# Patient Record
Sex: MALE | Race: WHITE | NOT HISPANIC OR LATINO | URBAN - METROPOLITAN AREA
[De-identification: names, ages, dates, MRNs, and addresses within clinical notes are randomized per-mention and may not be internally consistent; named-entity substitution may affect disease eponyms.]

---

## 2023-04-28 ENCOUNTER — LAB (OUTPATIENT)
Dept: LAB | Facility: HOSPITAL | Age: 63
End: 2023-04-28
Payer: COMMERCIAL

## 2023-04-28 ENCOUNTER — OFFICE VISIT (OUTPATIENT)
Dept: FAMILY MEDICINE CLINIC | Facility: CLINIC | Age: 63
End: 2023-04-28
Payer: COMMERCIAL

## 2023-04-28 VITALS
BODY MASS INDEX: 29.68 KG/M2 | HEIGHT: 71 IN | SYSTOLIC BLOOD PRESSURE: 124 MMHG | DIASTOLIC BLOOD PRESSURE: 84 MMHG | WEIGHT: 212 LBS | TEMPERATURE: 97.8 F | HEART RATE: 80 BPM | OXYGEN SATURATION: 98 %

## 2023-04-28 DIAGNOSIS — R35.1 NOCTURIA: ICD-10-CM

## 2023-04-28 DIAGNOSIS — B00.9 HSV INFECTION: Primary | ICD-10-CM

## 2023-04-28 DIAGNOSIS — E03.9 HYPOTHYROIDISM, UNSPECIFIED TYPE: ICD-10-CM

## 2023-04-28 PROBLEM — M25.561 ARTHRALGIA OF RIGHT KNEE: Status: ACTIVE | Noted: 2019-04-22

## 2023-04-28 PROBLEM — E78.49 OTHER HYPERLIPIDEMIA: Status: ACTIVE | Noted: 2021-11-23

## 2023-04-28 PROBLEM — M25.529 ARTHRALGIA OF ELBOW: Status: ACTIVE | Noted: 2019-11-11

## 2023-04-28 PROBLEM — M54.50 LOWER BACK PAIN: Status: ACTIVE | Noted: 2021-08-19

## 2023-04-28 PROBLEM — M25.551 PAIN OF RIGHT HIP JOINT: Status: ACTIVE | Noted: 2022-10-10

## 2023-04-28 PROBLEM — M25.511 PAIN IN JOINT OF RIGHT SHOULDER: Status: ACTIVE | Noted: 2020-06-19

## 2023-04-28 PROBLEM — I34.0 MITRAL VALVE REGURGITATION: Status: ACTIVE | Noted: 2021-11-23

## 2023-04-28 PROBLEM — R03.0 ELEVATED BP WITHOUT DIAGNOSIS OF HYPERTENSION: Status: ACTIVE | Noted: 2021-11-23

## 2023-04-28 LAB — PSA SERPL-MCNC: 0.43 NG/ML (ref 0–4)

## 2023-04-28 PROCEDURE — 36415 COLL VENOUS BLD VENIPUNCTURE: CPT

## 2023-04-28 PROCEDURE — 99203 OFFICE O/P NEW LOW 30 MIN: CPT | Performed by: FAMILY MEDICINE

## 2023-04-28 PROCEDURE — 84153 ASSAY OF PSA TOTAL: CPT

## 2023-04-28 RX ORDER — NAPROXEN 500 MG/1
500 TABLET ORAL
COMMUNITY

## 2023-04-28 RX ORDER — ASPIRIN 81 MG/1
TABLET ORAL
COMMUNITY

## 2023-04-28 RX ORDER — ACYCLOVIR 400 MG/1
800 TABLET ORAL
COMMUNITY
End: 2023-04-28 | Stop reason: SDUPTHER

## 2023-04-28 RX ORDER — ACYCLOVIR 400 MG/1
800 TABLET ORAL
Qty: 20 TABLET | Refills: 1 | Status: SHIPPED | OUTPATIENT
Start: 2023-04-28

## 2023-04-28 RX ORDER — LEVOTHYROXINE SODIUM 0.12 MG/1
1 TABLET ORAL DAILY
COMMUNITY
Start: 2023-02-24 | End: 2023-04-28

## 2023-04-28 RX ORDER — LEVOTHYROXINE SODIUM 125 UG/1
CAPSULE ORAL
COMMUNITY

## 2023-04-28 RX ORDER — ACETAMINOPHEN 650 MG/1
650 TABLET, FILM COATED, EXTENDED RELEASE ORAL 3 TIMES DAILY
COMMUNITY
Start: 2023-01-13

## 2023-04-28 NOTE — PROGRESS NOTES
New Patient Office Visit      Patient Name: José Miguel Underwood  : 1960   MRN: [id number]     Chief Complaint:    Chief Complaint   Patient presents with   • Arthritis     Cold sores. IT BAND SYNDROME.       History of Present Illness: José Miguel Underwood is a 63 y.o. male who is here today to establish care.  He is seeing [facility] for IT band syndrome This started in  and he takes naproxen and uses heating pad. He uses tylenol. His pain is bilateral. His right IT is torn. He may need surgery.     Never had colonoscopy. is thinking about getting. No family history of colon cancer, but father had polyps.     Prostate cancer screen. He is wanting cbc, psa, a1c, cmp, tsh.     Hypothyroidism is doing well. He reports normal levels. Has been on similar level for a while.    He does get tired in the evenings. He works a lot and thinks a lot of tiredness is from work load. He takes aleve PM to sleep. He reports being a light sleeper. He drinks a couple cups of coffee per week. Been going on for a couple months. Does get up to urinate at times. He drinks a lot water and coffee.     He went to  for heart testing. They did a calcium score and reports he has blockages. His EF is slightly below normal. He will get records for us      Review of systems positive for tiredness      Physical exam: Patient's heart exam RRR.  Lung exam CTA bilateral.  Mood and affect appropriate.  No lymphadenopathy in the cervical region.      Subjective          Past Medical History: History reviewed. No pertinent past medical history.    Past Surgical History: History reviewed. No pertinent surgical history.    Family History:   Family History   Problem Relation Age of Onset   • Diabetes Mother        Social History:   Social History     Socioeconomic History   • Marital status: Single   Tobacco Use   • Smoking status: Never   • Smokeless tobacco: Never   Vaping Use   • Vaping Use: Never used   Substance and Sexual Activity   •  "Alcohol use: Never   • Drug use: Never   • Sexual activity: Yes     Partners: Female       Medications:     Current Outpatient Medications:   •  acyclovir (ZOVIRAX) 400 MG tablet, Take 2 tablets by mouth Every 4 (Four) Hours While Awake. Take no more than 5 doses a day., Disp: 20 tablet, Rfl: 1  •  aspirin 81 MG EC tablet, Asprin Ec Low Dose, Disp: , Rfl:   •  levothyroxine sodium (TIROSINT) 125 MCG capsule capsule, levothyroxine, Disp: , Rfl:   •  Mapap Arthritis Pain 650 MG 8 hr tablet, Take 1 tablet by mouth 3 (Three) Times a Day., Disp: , Rfl:   •  naproxen (NAPROSYN) 500 MG tablet, Take 1 tablet by mouth., Disp: , Rfl:     Allergies:   Allergies   Allergen Reactions   • Shrimp Flavor Swelling   • Diclofenac Hives   • Loop Diuretics Nausea Only, Diarrhea and GI Intolerance   • Shellfish Allergy Hives   • Sulfa Antibiotics Hives       Objective     Physical Exam: Please see above  Vital Signs:   Vitals:    04/28/23 0837   BP: 124/84   Pulse: 80   Temp: 97.8 °F (36.6 °C)   SpO2: 98%   Weight: 96.2 kg (212 lb)   Height: 180.3 cm (71\")     Body mass index is 29.57 kg/m².       Assessment / Plan      Assessment/Plan:   Diagnoses and all orders for this visit:    1. HSV infection (Primary)  -     acyclovir (ZOVIRAX) 400 MG tablet; Take 2 tablets by mouth Every 4 (Four) Hours While Awake. Take no more than 5 doses a day.  Dispense: 20 tablet; Refill: 1    2. Nocturia  -     PSA DIAGNOSTIC ONLY; Future    3. Hypothyroidism, unspecified type         1. Patient reports hypothyroidism has been controlled since last year's labs.  Denies any new symptoms other than tiredness.  2. Nocturia-worsening issue.  We will check PSA today.  3. HSV treatment-patient requesting refills on acyclovir.  A refills given today.  Symptoms controlled currently.    Chronic conditions nocturia, hypothyroidism, HSV addressed.  Blood work ordered to address nocturia worsening.  We will consider further work-up in the future if it does not " improve.  Hypothyroidism is reportedly well controlled, will recheck levels at next visit.  HSV well-controlled, refilled medications today.    Multiple chronic conditions assessed above.  Nocturia worsening, hypothyroidism and HSV stable.  Refilled HSV medication.      Follow Up:   Return in about 4 months (around 8/28/2023) for Annual, Labs today.    Timoteo Hudson DO  [facility] [place]

## 2023-05-22 RX ORDER — LEVOTHYROXINE SODIUM 125 UG/1
125 CAPSULE ORAL DAILY
Qty: 30 CAPSULE | Refills: 0 | Status: SHIPPED | OUTPATIENT
Start: 2023-05-22

## 2023-05-22 NOTE — TELEPHONE ENCOUNTER
Rx Refill Note  Requested Prescriptions     Pending Prescriptions Disp Refills   • levothyroxine sodium (TIROSINT) 125 MCG capsule capsule 30 capsule       Last office visit with prescribing clinician: 4/28/2023   Last telemedicine visit with prescribing clinician: 4/28/2023   Next office visit with prescribing clinician: 9/28/2023                         Would you like a call back once the refill request has been completed: [] Yes [] No    If the office needs to give you a call back, can they leave a voicemail: [] Yes [] No    Chapis Villafuerte MA  05/22/23, 09:20 EDT

## 2023-05-22 NOTE — TELEPHONE ENCOUNTER
Caller: José Miguel Underwood    Relationship: Self    Best call back number: [phone]    Requested Prescriptions:   Requested Prescriptions     Pending Prescriptions Disp Refills   • levothyroxine sodium (TIROSINT) 125 MCG capsule capsule 30 capsule         Pharm[facility] where request should be sent[id number] [place], [place] - [address] [facility] [address]  - [phone]  - [phone] FX     Last office visit with prescribing clinician: 4/28/2023   Last telemedicine visit with prescribing clinician: 4/28/2023   Next office visit with prescribing clinician: 9/28/2023     Additional details provided by patient:     Does the patient have less than a 3 day supply:  [] Yes  [x] No    Would you like a call back once the refill request has been completed: [] Yes [x] No    If the office needs to give you a call back, can they leave a voicemail: [] Yes [x] No    Jann Melo Rep   05/22/23 08:59 EDT

## 2023-08-22 ENCOUNTER — OFFICE VISIT (OUTPATIENT)
Dept: FAMILY MEDICINE CLINIC | Facility: CLINIC | Age: 63
End: 2023-08-22
Payer: COMMERCIAL

## 2023-08-22 VITALS
WEIGHT: 208.8 LBS | SYSTOLIC BLOOD PRESSURE: 154 MMHG | HEIGHT: 71 IN | HEART RATE: 80 BPM | BODY MASS INDEX: 29.23 KG/M2 | DIASTOLIC BLOOD PRESSURE: 96 MMHG | TEMPERATURE: 98.7 F

## 2023-08-22 DIAGNOSIS — R21 RASH: ICD-10-CM

## 2023-08-22 DIAGNOSIS — R50.9 FEVER, UNSPECIFIED FEVER CAUSE: ICD-10-CM

## 2023-08-22 DIAGNOSIS — U07.1 COVID-19 VIRUS DETECTED: Primary | ICD-10-CM

## 2023-08-22 LAB
EXPIRATION DATE: ABNORMAL
INTERNAL CONTROL: ABNORMAL
Lab: ABNORMAL
SARS-COV-2 AG UPPER RESP QL IA.RAPID: DETECTED

## 2023-08-22 PROCEDURE — 99213 OFFICE O/P EST LOW 20 MIN: CPT | Performed by: FAMILY MEDICINE

## 2023-08-22 PROCEDURE — 87426 SARSCOV CORONAVIRUS AG IA: CPT | Performed by: FAMILY MEDICINE

## 2023-08-22 RX ORDER — TRIAMCINOLONE ACETONIDE 5 MG/G
1 CREAM TOPICAL 2 TIMES DAILY
Qty: 30 G | Refills: 0 | Status: SHIPPED | OUTPATIENT
Start: 2023-08-22

## 2023-08-22 NOTE — ASSESSMENT & PLAN NOTE
Managing Coronavirus (Covid-19) Symptoms at home  Signs and Symptoms of Covid-19 may range from a mild case of sniffles to severe disease requiring hospitalization. The majority of patients will only experience a mild illness and recover well at home with some care. Treatment is aimed at symptom control - rest, fluid intake, fever reduction, cough suppression and pain relief.   Stay home and isolate yourself from family members. Use delivery services to have groceries and medications delivered or ask friends to drop-off groceries at your doorstep. You may want to have friends or family take your pets for a few days.   Monitor yourself: Take your temperature twice a day. If you own a pulse oximeter, make sure you know how to use it properly. Your pulse oximeter should read more than 92%.  Get plenty of rest, stay hydrated and use over the counter medications as necessary. Ask your doctor about the doses for these medications.     Hydration   Good hydration can alleviate many of the symptoms. Drink plenty of water or sports drinks, if you have a dry cough, a teaspoon of honey in hot water can help soothe your throat. If you have congestion, a warm, non-caffeinated beverage or warm shower can help loosen mucus.    WHO Oral Rehydration Solution:  3/8 tsp salt (sodium chloride)  1/4 tsp Kim Salt Substitute (potassium chloride)  1/2 tsp baking soda (sodium bicarbonate)  2 tablespoon + 2 teaspoon sugar  Add tap water to make 1 liter   Optional: May add NutraSweet or Splenda for flavor, Crystal light or sugar free Jello also works.   Do not use red or purple colored Jello or Crystal light.     Over the counter medications:   Acetaminophen (Tylenol): reduces fever and pain - body aches, sore throat etc.  Guaifenesin (Mucinex) - expectorant, reduces chest congestion, helps bring up mucous.   Dextromethorphan (Mucinex-DM or Delsym) - cough suppressant  Afrin - nasal decongestant. Helps open up a stuffy nose. Be careful  not to use this medication more than twice a day for no more than three days in a row, or your symptoms may get worse.     Prescription medications:   Sometimes you will need prescription medications to manage your symptoms. Here are some common ones that are used.   Ondansetron (Zofran) - used to prevent nausea or vomiting. It comes in a pill and an under the tongue formulation.   Promethazine (Phenergan) and Prochlorperazine (Compazine) - also used to treat nausea and vomiting. Both of these are available as tablets and suppositories  Benzonatate (Tessalon) - cough suppressant. It is important that you take this capsule whole and not crush or chew it.     How to avoid infecting other members of your household:  Avoid contact with members of your household, including pets.  Stay home from work, school and public areas unless it's to get medical care.  Avoid using public transportation, ride-sharing services or taxis.  Stay isolated in one room, away from your family and other people, as much as possible. This includes eating in your room. Open windows to keep air circulating. Use a separate bathroom, if possible.  Avoid shared spaces in your home as much as possible. When using shared spaces, limit your movements and wear a mask. Keep your kitchen and other shared spaces well ventilated. Stay at least 6 feet (2 meters) away from your family members.  Clean often-touched surfaces in your separate room and bathroom, such as doorknobs, light switches, electronics and counters, every day.  Avoid sharing personal household items such as dishes, towels, bedding and electronics.  Wear a face mask when near others. Change the face mask each day.  If wearing a face mask isn't possible, cover your mouth and nose with a tissue or elbow when coughing or sneezing. Afterward, throw away the tissue or wash the handkerchief.  Frequently wash your hands with soap and water for at least 20 seconds, or use an alcohol-based hand   that contains at least 60% alcohol.    Signs that should warrant an evaluation at a higher level of care such as a local emergency room:  Trouble breathing when you are resting or walking short distances (such as from your bedroom to bathroom).  Your pulse oximeter reading is below 92% and does not improve with slow deep breathing and lying on your stomach.  A fever of 102 F(38.9 C) or higher that lasts for 24 hours and does not get better after you take acetaminophen (Tylenol).  Persistent chest pain or pressure.  Blood in your sputum.  A very bad headache that will not improve or go away with Tylenol.  New confusion.  Bluish lips or face.  Inability to stay awake.  Pale gray or blue-colored skin, lips or nail beds (depends on skin tone).    Isolation period: Isolation period of at least 5 days if symptoms are improving.     Note:   If you are immunosuppressed due to cancer, chemotherapy or use of certain medications, please discuss specific guidelines with your physician/physician assistant/nurse practitioner.

## 2023-08-22 NOTE — PROGRESS NOTES
"Chief Complaint  Insect Bite (Pt states that he has deer ticks ) and Fever (Nausea, headache between eyes, joint aches, drainage/)    Subjective        José Miguel Underwood presents to [facility]  Fever   This is a new problem. The current episode started in the past 7 days. The problem has been waxing and waning. Associated symptoms include congestion, coughing, headaches, muscle aches, nausea and sleepiness. He has tried NSAIDs, acetaminophen and fluids for the symptoms. The treatment provided mild relief.         Objective   Vital Signs:  /96   Pulse 80   Temp 98.7 øF (37.1 øC) (Infrared)   Ht 180.3 cm (70.98\")   Wt 94.7 kg (208 lb 12.8 oz)   BMI 29.13 kg/mý   Estimated body mass index is 29.13 kg/mý as calculated from the following:    Height as of this encounter: 180.3 cm (70.98\").    Weight as of this encounter: 94.7 kg (208 lb 12.8 oz).           Physical Exam  Constitutional:       Appearance: He is not ill-appearing.   HENT:      Right Ear: Tympanic membrane normal.      Left Ear: Tympanic membrane normal.      Nose: Rhinorrhea present.   Eyes:      Pupils: Pupils are equal, round, and reactive to light.   Cardiovascular:      Rate and Rhythm: Normal rate.      Pulses: Normal pulses.   Pulmonary:      Effort: Pulmonary effort is normal.      Breath sounds: Normal breath sounds.   Skin:     Capillary Refill: Capillary refill takes less than 2 seconds.   Neurological:      General: No focal deficit present.      Mental Status: He is alert. Mental status is at baseline.   Psychiatric:         Mood and Affect: Mood normal.         Behavior: Behavior normal.         Thought Content: Thought content normal.         Judgment: Judgment normal.      Result Review :           Assessment and Plan   Diagnoses and all orders for this visit:    1. COVID-19 virus detected (Primary)  Assessment & Plan:    Managing Coronavirus (Covid-19) Symptoms at home  Signs and Symptoms of " Covid-19 may range from a mild case of sniffles to severe disease requiring hospitalization. The majority of patients will only experience a mild illness and recover well at home with some care. Treatment is aimed at symptom control - rest, fluid intake, fever reduction, cough suppression and pain relief.   Stay home and isolate yourself from family members. Use delivery services to have groceries and medications delivered or ask friends to drop-off groceries at your doorstep. You may want to have friends or family take your pets for a few days.   Monitor yourself: Take your temperature twice a day. If you own a pulse oximeter, make sure you know how to use it properly. Your pulse oximeter should read more than 92%.  Get plenty of rest, stay hydrated and use over the counter medications as necessary. Ask your doctor about the doses for these medications.     Hydration   Good hydration can alleviate many of the symptoms. Drink plenty of water or sports drinks, if you have a dry cough, a teaspoon of honey in hot water can help soothe your throat. If you have congestion, a warm, non-caffeinated beverage or warm shower can help loosen mucus.    WHO Oral Rehydration Solution:  3/8 tsp salt (sodium chloride)  1/4 tsp Kim Salt Substitute (potassium chloride)  1/2 tsp baking soda (sodium bicarbonate)  2 tablespoon + 2 teaspoon sugar  Add tap water to make 1 liter   Optional: May add NutraSweet or Splenda for flavor, Crystal light or sugar free Jello also works.   Do not use red or purple colored Jello or Crystal light.     Over the counter medications:   Acetaminophen (Tylenol): reduces fever and pain - body aches, sore throat etc.  Guaifenesin (Mucinex) - expectorant, reduces chest congestion, helps bring up mucous.   Dextromethorphan (Mucinex-DM or Delsym) - cough suppressant  Afrin - nasal decongestant. Helps open up a stuffy nose. Be careful not to use this medication more than twice a day for no more than three days  in a row, or your symptoms may get worse.     Prescription medications:   Sometimes you will need prescription medications to manage your symptoms. Here are some common ones that are used.   Ondansetron (Zofran) - used to prevent nausea or vomiting. It comes in a pill and an under the tongue formulation.   Promethazine (Phenergan) and Prochlorperazine (Compazine) - also used to treat nausea and vomiting. Both of these are available as tablets and suppositories  Benzonatate (Tessalon) - cough suppressant. It is important that you take this capsule whole and not crush or chew it.     How to avoid infecting other members of your household:  Avoid contact with members of your household, including pets.  Stay home from work, school and public areas unless it's to get medical care.  Avoid using public transportation, ride-sharing services or taxis.  Stay isolated in one room, away from your family and other people, as much as possible. This includes eating in your room. Open windows to keep air circulating. Use a separate bathroom, if possible.  Avoid shared spaces in your home as much as possible. When using shared spaces, limit your movements and wear a mask. Keep your kitchen and other shared spaces well ventilated. Stay at least 6 feet (2 meters) away from your family members.  Clean often-touched surfaces in your separate room and bathroom, such as doorknobs, light switches, electronics and counters, every day.  Avoid sharing personal household items such as dishes, towels, bedding and electronics.  Wear a face mask when near others. Change the face mask each day.  If wearing a face mask isn't possible, cover your mouth and nose with a tissue or elbow when coughing or sneezing. Afterward, throw away the tissue or wash the handkerchief.  Frequently wash your hands with soap and water for at least 20 seconds, or use an alcohol-based hand  that contains at least 60% alcohol.    Signs that should warrant an  evaluation at a higher level of care such as a local emergency room:  Trouble breathing when you are resting or walking short distances (such as from your bedroom to bathroom).  Your pulse oximeter reading is below 92% and does not improve with slow deep breathing and lying on your stomach.  A fever of 102 F(38.9 C) or higher that lasts for 24 hours and does not get better after you take acetaminophen (Tylenol).  Persistent chest pain or pressure.  Blood in your sputum.  A very bad headache that will not improve or go away with Tylenol.  New confusion.  Bluish lips or face.  Inability to stay awake.  Pale gray or blue-colored skin, lips or nail beds (depends on skin tone).    Isolation period: Isolation period of at least 5 days if symptoms are improving.     Note:   If you are immunosuppressed due to cancer, chemotherapy or use of certain medications, please discuss specific guidelines with your physician/physician assistant/nurse practitioner.              2. Fever, unspecified fever cause  -     POCT VERITOR SARS-CoV-2 Antigen  -     Cancel: POC Influenza A / B    3. Rash  Assessment & Plan:  Most likely contact secondary to chemicals used to deter ticks.  Prescription for triamcinolone      Other orders  -     triamcinolone (KENALOG) 0.5 % cream; Apply 1 application  topically to the appropriate area as directed 2 (Two) Times a Day.  Dispense: 30 g; Refill: 0             Follow Up   No follow-ups on file.  Patient was given instructions and counseling regarding his condition or for health maintenance advice. Please see specific information pulled into the AVS if appropriate.       This document has been electronically signed by Ade Worrell DO   August 22, 2023 11:18 EDT    Dictated Utilizing Dragon Dictation: Part of this note may be an electronic transcription/translation of spoken language to printed text using the Dragon Dictation System.    Ade Worrell D.O.  [facility] [place]

## 2023-08-22 NOTE — LETTER
August 22, 2023     Patient: José Miguel Underwood   YOB: 1960   Date of Visit: 8/22/2023       To Whom It May Concern:    It is my medical opinion that José Miguel Underwood may return to work on 8/26/23         Sincerely,        Ade Worrell DO    CC: No Recipients

## 2023-09-28 ENCOUNTER — OFFICE VISIT (OUTPATIENT)
Dept: FAMILY MEDICINE CLINIC | Facility: CLINIC | Age: 63
End: 2023-09-28
Payer: COMMERCIAL

## 2023-09-28 VITALS
HEART RATE: 108 BPM | DIASTOLIC BLOOD PRESSURE: 94 MMHG | WEIGHT: 208 LBS | HEIGHT: 71 IN | SYSTOLIC BLOOD PRESSURE: 136 MMHG | BODY MASS INDEX: 29.12 KG/M2

## 2023-09-28 DIAGNOSIS — E03.9 HYPOTHYROIDISM, UNSPECIFIED TYPE: ICD-10-CM

## 2023-09-28 DIAGNOSIS — E78.41 ELEVATED LIPOPROTEIN(A): ICD-10-CM

## 2023-09-28 DIAGNOSIS — E66.3 OVERWEIGHT (BMI 25.0-29.9): ICD-10-CM

## 2023-09-28 DIAGNOSIS — M25.551 CHRONIC PAIN OF RIGHT HIP: ICD-10-CM

## 2023-09-28 DIAGNOSIS — G89.29 CHRONIC PAIN OF RIGHT HIP: ICD-10-CM

## 2023-09-28 DIAGNOSIS — Z12.5 PROSTATE CANCER SCREENING: ICD-10-CM

## 2023-09-28 DIAGNOSIS — Z11.59 NEED FOR HEPATITIS C SCREENING TEST: ICD-10-CM

## 2023-09-28 DIAGNOSIS — Z12.11 ENCOUNTER FOR COLORECTAL CANCER SCREENING: ICD-10-CM

## 2023-09-28 DIAGNOSIS — Z11.4 ENCOUNTER FOR SCREENING FOR HIV: ICD-10-CM

## 2023-09-28 DIAGNOSIS — Z12.12 ENCOUNTER FOR COLORECTAL CANCER SCREENING: ICD-10-CM

## 2023-09-28 DIAGNOSIS — Z00.00 ANNUAL PHYSICAL EXAM: Primary | ICD-10-CM

## 2023-09-28 RX ORDER — DULOXETIN HYDROCHLORIDE 30 MG/1
30 CAPSULE, DELAYED RELEASE ORAL DAILY
Qty: 30 CAPSULE | Refills: 2 | Status: SHIPPED | OUTPATIENT
Start: 2023-09-28

## 2023-09-28 RX ORDER — NAPROXEN 500 MG/1
500 TABLET ORAL 2 TIMES DAILY WITH MEALS
Qty: 180 TABLET | Refills: 3 | Status: SHIPPED | OUTPATIENT
Start: 2023-09-28

## 2023-09-28 RX ORDER — ACYCLOVIR 400 MG/1
TABLET ORAL
COMMUNITY
Start: 2023-09-10

## 2023-09-28 RX ORDER — LEVOTHYROXINE SODIUM 125 UG/1
125 CAPSULE ORAL DAILY
Qty: 90 CAPSULE | Refills: 3 | Status: SHIPPED | OUTPATIENT
Start: 2023-09-28 | End: 2023-10-02

## 2023-09-28 NOTE — PROGRESS NOTES
Follow Up Office Visit      Patient Name: José Miguel Underwood  : 1960   MRN: [id number]     Chief Complaint:    Chief Complaint   Patient presents with    Annual Exam       History of Present Illness: José Miguel Underwood is a 63 y.o. male who is here today to follow up with chronic hip pain, hypothyroidism, HSV.  Patient is here for his annual exam.  He is overweight.  He reports that he has tried losing weight in the past with diet and exercise.  He has hit a plateau.    Has chronic hip pain and IT band pain.  Goes to a specialist.  Has never heard or tried shockwave therapy.  Interested in medication otherwise it may help.  He takes naproxen daily    Hypothyroidism-controlled, needs repeat labs    HSV-controlled, as needed meds used    Annual exam: Discussed diet, exercise, vaccines, prostate cancer screening, colorectal cancer screening, dental and vision care    Physical exam: Patient's mood and affect is appropriate neurological exam grossly intact.  PERRLA.  Neck supple.  Lung exam CTA bilateral.  Heart exam RRR.  Bilateral ear exam normal.    Subjective        I have reviewed and the following portions of the patient's history were updated as appropriate: past family history, past medical history, past social history, past surgical history and problem list.    Medications:     Current Outpatient Medications:     acyclovir (ZOVIRAX) 400 MG tablet, TAKE 2 TABLETS BY MOUTH EVERY 4 HOURS WHILE AWAKE. NO MORE THAN 5 DOSES A DAY, Disp: , Rfl:     aspirin 81 MG EC tablet, Asprin Ec Low Dose, Disp: , Rfl:     levothyroxine sodium (TIROSINT) 125 MCG capsule capsule, Take 1 capsule by mouth Daily., Disp: 90 capsule, Rfl: 3    Mapap Arthritis Pain 650 MG 8 hr tablet, Take 1 tablet by mouth 3 (Three) Times a Day., Disp: , Rfl:     naproxen (NAPROSYN) 500 MG tablet, Take 1 tablet by mouth 2 (Two) Times a Day With Meals., Disp: 180 tablet, Rfl: 3    DULoxetine (Cymbalta) 30 MG capsule, Take 1 capsule by mouth Daily.,  "Disp: 30 capsule, Rfl: 2    Allergies:   Allergies   Allergen Reactions    Shrimp Flavor Swelling    Diclofenac Hives    Loop Diuretics Nausea Only, Diarrhea and GI Intolerance    Shellfish Allergy Hives    Sulfa Antibiotics Hives       Objective     Physical Exam: Please see above  Vital Signs:   Vitals:    09/28/23 1452   BP: 136/94   Pulse: 108   Weight: 94.3 kg (208 lb)   Height: 180.3 cm (71\")     Body mass index is 29.01 kg/m².          Assessment / Plan      Assessment/Plan:   Diagnoses and all orders for this visit:    1. Annual physical exam (Primary)  -     CBC & Differential; Future  -     Comprehensive Metabolic Panel; Future    2. Overweight (BMI 25.0-29.9)  -     Ambulatory Referral to Weight Management Program    3. Hypothyroidism, unspecified type  -     levothyroxine sodium (TIROSINT) 125 MCG capsule capsule; Take 1 capsule by mouth Daily.  Dispense: 90 capsule; Refill: 3  -     TSH Rfx On Abnormal To Free T4; Future    4. Chronic pain of right hip  -     naproxen (NAPROSYN) 500 MG tablet; Take 1 tablet by mouth 2 (Two) Times a Day With Meals.  Dispense: 180 tablet; Refill: 3  -     DULoxetine (Cymbalta) 30 MG capsule; Take 1 capsule by mouth Daily.  Dispense: 30 capsule; Refill: 2    5. Elevated lipoprotein(a)  -     Lipid Panel; Future    6. Prostate cancer screening  -     PSA Screen; Future    7. Need for hepatitis C screening test  -     Hepatitis C Antibody; Future    8. Encounter for screening for HIV  -     HIV-1 / O / 2 Ag / Antibody; Future    9. Encounter for colorectal cancer screening  -     Ambulatory Referral For Screening Colonoscopy       Annual exam counseling: Counseled patient regards to diet, exercise, culture [place] screening, HIV screening, hepatitis C screening, PSA for prostate cancer screening.  Discussed dental and vision care.  Refer patient to weight management    Refer patient to weight management.  Patient interesting and exercise and dieting.  May be interested in " medications.    Hypothyroidism-controlled, recheck today    Hip pain-recommend shockwave therapy.  Patient given information for chiropractor.  Continue naproxen.  Start Cymbalta.  Can increase dose in 1-2 months to help with pain further if needed, follow-up in 3 months to reevaluate-we will wean patient off if is not helping        BMI is >= 25 and <30. (Overweight) The following options were offered after discussion;: weight loss educational material (shared in after visit summary), exercise counseling/recommendations, and nutrition counseling/recommendations     Follow Up:   Return in about 3 months (around 12/28/2023) for Recheck.    Timoteo Hudson DO  [facility] [place]

## 2023-10-02 DIAGNOSIS — E03.9 HYPOTHYROIDISM, UNSPECIFIED TYPE: Primary | ICD-10-CM

## 2023-10-02 RX ORDER — LEVOTHYROXINE SODIUM 0.12 MG/1
125 TABLET ORAL DAILY
Qty: 90 TABLET | Refills: 1 | Status: SHIPPED | OUTPATIENT
Start: 2023-10-02

## 2023-10-10 ENCOUNTER — LAB (OUTPATIENT)
Dept: LAB | Facility: HOSPITAL | Age: 63
End: 2023-10-10
Payer: COMMERCIAL

## 2023-10-10 DIAGNOSIS — Z11.59 NEED FOR HEPATITIS C SCREENING TEST: ICD-10-CM

## 2023-10-10 DIAGNOSIS — Z11.4 ENCOUNTER FOR SCREENING FOR HIV: ICD-10-CM

## 2023-10-10 DIAGNOSIS — Z00.00 ANNUAL PHYSICAL EXAM: ICD-10-CM

## 2023-10-10 DIAGNOSIS — E03.9 HYPOTHYROIDISM, UNSPECIFIED TYPE: ICD-10-CM

## 2023-10-10 DIAGNOSIS — Z12.5 PROSTATE CANCER SCREENING: ICD-10-CM

## 2023-10-10 DIAGNOSIS — E78.41 ELEVATED LIPOPROTEIN(A): ICD-10-CM

## 2023-10-10 PROCEDURE — 86803 HEPATITIS C AB TEST: CPT

## 2023-10-10 PROCEDURE — G0103 PSA SCREENING: HCPCS

## 2023-10-10 PROCEDURE — 80050 GENERAL HEALTH PANEL: CPT

## 2023-10-10 PROCEDURE — G0432 EIA HIV-1/HIV-2 SCREEN: HCPCS

## 2023-10-10 PROCEDURE — 80061 LIPID PANEL: CPT

## 2023-10-10 PROCEDURE — 84439 ASSAY OF FREE THYROXINE: CPT

## 2023-10-11 LAB
ALBUMIN SERPL-MCNC: 4.7 G/DL (ref 3.5–5.2)
ALBUMIN/GLOB SERPL: 1.8 G/DL
ALP SERPL-CCNC: 94 U/L (ref 39–117)
ALT SERPL W P-5'-P-CCNC: 24 U/L (ref 1–41)
ANION GAP SERPL CALCULATED.3IONS-SCNC: 11 MMOL/L (ref 5–15)
AST SERPL-CCNC: 23 U/L (ref 1–40)
BASOPHILS # BLD AUTO: 0.1 10*3/MM3 (ref 0–0.2)
BASOPHILS NFR BLD AUTO: 1.1 % (ref 0–1.5)
BILIRUB SERPL-MCNC: 0.4 MG/DL (ref 0–1.2)
BUN SERPL-MCNC: 15 MG/DL (ref 8–23)
BUN/CREAT SERPL: 12.5 (ref 7–25)
CALCIUM SPEC-SCNC: 9.9 MG/DL (ref 8.6–10.5)
CHLORIDE SERPL-SCNC: 107 MMOL/L (ref 98–107)
CHOLEST SERPL-MCNC: 209 MG/DL (ref 0–200)
CO2 SERPL-SCNC: 24 MMOL/L (ref 22–29)
CREAT SERPL-MCNC: 1.2 MG/DL (ref 0.76–1.27)
DEPRECATED RDW RBC AUTO: 43.1 FL (ref 37–54)
EGFRCR SERPLBLD CKD-EPI 2021: 68 ML/MIN/1.73
EOSINOPHIL # BLD AUTO: 0.63 10*3/MM3 (ref 0–0.4)
EOSINOPHIL NFR BLD AUTO: 6.9 % (ref 0.3–6.2)
ERYTHROCYTE [DISTWIDTH] IN BLOOD BY AUTOMATED COUNT: 13.7 % (ref 12.3–15.4)
GLOBULIN UR ELPH-MCNC: 2.6 GM/DL
GLUCOSE SERPL-MCNC: 96 MG/DL (ref 65–99)
HCT VFR BLD AUTO: 42.7 % (ref 37.5–51)
HCV AB SER DONR QL: NORMAL
HDLC SERPL-MCNC: 39 MG/DL (ref 40–60)
HGB BLD-MCNC: 14.5 G/DL (ref 13–17.7)
HIV 1+2 AB+HIV1 P24 AG SERPL QL IA: NORMAL
IMM GRANULOCYTES # BLD AUTO: 0.04 10*3/MM3 (ref 0–0.05)
IMM GRANULOCYTES NFR BLD AUTO: 0.4 % (ref 0–0.5)
LDLC SERPL CALC-MCNC: 139 MG/DL (ref 0–100)
LDLC/HDLC SERPL: 3.47 {RATIO}
LYMPHOCYTES # BLD AUTO: 2.48 10*3/MM3 (ref 0.7–3.1)
LYMPHOCYTES NFR BLD AUTO: 27.1 % (ref 19.6–45.3)
MCH RBC QN AUTO: 29.4 PG (ref 26.6–33)
MCHC RBC AUTO-ENTMCNC: 34 G/DL (ref 31.5–35.7)
MCV RBC AUTO: 86.4 FL (ref 79–97)
MONOCYTES # BLD AUTO: 0.62 10*3/MM3 (ref 0.1–0.9)
MONOCYTES NFR BLD AUTO: 6.8 % (ref 5–12)
NEUTROPHILS NFR BLD AUTO: 5.27 10*3/MM3 (ref 1.7–7)
NEUTROPHILS NFR BLD AUTO: 57.7 % (ref 42.7–76)
NRBC BLD AUTO-RTO: 0 /100 WBC (ref 0–0.2)
PLATELET # BLD AUTO: 269 10*3/MM3 (ref 140–450)
PMV BLD AUTO: 11.1 FL (ref 6–12)
POTASSIUM SERPL-SCNC: 5.2 MMOL/L (ref 3.5–5.2)
PROT SERPL-MCNC: 7.3 G/DL (ref 6–8.5)
PSA SERPL-MCNC: 1.15 NG/ML (ref 0–4)
RBC # BLD AUTO: 4.94 10*6/MM3 (ref 4.14–5.8)
SODIUM SERPL-SCNC: 142 MMOL/L (ref 136–145)
T4 FREE SERPL-MCNC: 1.57 NG/DL (ref 0.93–1.7)
TRIGL SERPL-MCNC: 173 MG/DL (ref 0–150)
TSH SERPL DL<=0.05 MIU/L-ACNC: 4.35 UIU/ML (ref 0.27–4.2)
VLDLC SERPL-MCNC: 31 MG/DL (ref 5–40)
WBC NRBC COR # BLD: 9.14 10*3/MM3 (ref 3.4–10.8)

## 2023-11-20 ENCOUNTER — OFFICE VISIT (OUTPATIENT)
Dept: FAMILY MEDICINE CLINIC | Facility: CLINIC | Age: 63
End: 2023-11-20
Payer: COMMERCIAL

## 2023-11-20 VITALS
HEART RATE: 90 BPM | OXYGEN SATURATION: 100 % | DIASTOLIC BLOOD PRESSURE: 86 MMHG | WEIGHT: 208 LBS | BODY MASS INDEX: 29.12 KG/M2 | TEMPERATURE: 98.4 F | SYSTOLIC BLOOD PRESSURE: 136 MMHG | HEIGHT: 71 IN

## 2023-11-20 DIAGNOSIS — F07.81 POST CONCUSSION SYNDROME: ICD-10-CM

## 2023-11-20 DIAGNOSIS — Z23 IMMUNIZATION DUE: Primary | ICD-10-CM

## 2023-11-20 NOTE — PROGRESS NOTES
Follow Up Office Visit      Patient Name: José Miguel Underwood  : 1960   MRN: [id number]     Chief Complaint:    Chief Complaint   Patient presents with    Concussion     Had a pry bar hit him on top of head 2023. Short term memory not what it was. Having HA's and used to rarely get them.  When he turns his head from one side or the other, it takes time to focus. Sleeps more, no appetite.       History of Present Illness: José Miguel Underwood is a 63 y.o. male who is here today to follow up with postconcussion symptoms since he hit his head around 3 weeks ago.  Patient is here today with headache that occurs midline.  He is having issues with headache, focusing issues and memory issues.  These occur as the day goes on and is he tries to concentrate more.  He actually reports that he thought it was getting better until he was driving at nighttime recently and had issues with focusing.  Patient's been more tired recently.  This all started  when patient hit his self with a Crow bar.  Patient had a lot of blood coming from his scalp and he will to the ER the next day with a CT scan that was normal.  Patient says overall things have improved but over the past week or 2 he actually had worsening symptoms like headache with trying to focusing.  He also has headache with trying to watch TV or read.    No vomiting.  Patient able to wake up and be active throughout the day but symptoms are worsening.  Motor function seems to be appropriate      Physical exam: Neurologic exam grossly intact.  PERRLA.  Neck supple.  Somatic function noted in cervical region.      Subjective        I have reviewed and the following portions of the patient's history were updated as appropriate: past family history, past medical history, past social history, past surgical history and problem list.    Medications:     Current Outpatient Medications:     acyclovir (ZOVIRAX) 400 MG tablet, TAKE 2 TABLETS BY MOUTH EVERY 4  "HOURS WHILE AWAKE. NO MORE THAN 5 DOSES A DAY, Disp: , Rfl:     aspirin 81 MG EC tablet, Asprin Ec Low Dose, Disp: , Rfl:     levothyroxine (Synthroid) 125 MCG tablet, Take 1 tablet by mouth Daily., Disp: 90 tablet, Rfl: 1    Mapap Arthritis Pain 650 MG 8 hr tablet, Take 1 tablet by mouth 3 (Three) Times a Day., Disp: , Rfl:     naproxen (NAPROSYN) 500 MG tablet, Take 1 tablet by mouth 2 (Two) Times a Day With Meals., Disp: 180 tablet, Rfl: 3    Allergies:   Allergies   Allergen Reactions    Shrimp Flavor Swelling    Diclofenac Hives    Loop Diuretics Nausea Only, Diarrhea and GI Intolerance    Shellfish Allergy Hives    Sulfa Antibiotics Hives       Objective     Physical Exam: Please see above  Vital Signs:   Vitals:    11/20/23 1441   BP: 136/86   Pulse: 90   Temp: 98.4 °F (36.9 °C)   SpO2: 100%   Weight: 94.3 kg (208 lb)   Height: 180.3 cm (71\")     Body mass index is 29.01 kg/m².          Assessment / Plan      Assessment/Plan:   Diagnoses and all orders for this visit:    1. Immunization due (Primary)  -     Fluzone (or Fluarix & Flulaval for VFC) >6 Mos (5780-7519)    2. Post concussion syndrome    Patient presents with signs and symptoms of postconcussion syndrome.  Will monitor symptoms going forward and if worsening, will consider repeat CT and send patient to rehab.  May consider concussion clinic at .    Patient advised to watch his symptoms and to do less activity if his symptoms are worse.  Patient could likely have exacerbations of his symptoms daily and the symptoms may last up to 12 hours.  It would be best to stop working and to rest if these symptoms do occur.  I have given patient intermittent leave for the next month regarding his symptoms.  I will fill out [facility] paperwork for 4 weeks if this is received by our office.    If patient's symptoms worsen over the next 2 weeks, follow-up ASAP.  Patient given ER precautions    If symptoms are improved substantially in 4 weeks, he will be released to " work without restrictions.      Follow Up:   Return in about 4 weeks (around 12/18/2023), or if symptoms worsen or fail to improve, for Recheck.    Timoteo Hudson DO  [facility]   Answers submitted by the patient for this visit:  Primary Reason for Visit (Submitted on 11/19/2023)  What is the primary reason for your visit?: Neurological Problem  Neurological Problem Questionnaire (Submitted on 11/19/2023)  Chief Complaint: Neurologic complaint  altered mental status: Yes  clumsiness: Yes  focal sensory loss: No  focal weakness: No  loss of balance: Yes  memory loss: Yes  near-syncope: No  slurred speech: No  syncope: No  visual change: Yes  weakness: No  Chronicity: new  Onset: 1 to 4 weeks ago  Onset quality: gradually  Progression since onset: waxing and waning  Focality: facial  abdominal pain: No  auditory change: No  aura: Yes  back pain: No  bladder incontinence: No  bowel incontinence: No  chest pain: No  confusion: Yes  diaphoresis: No  dizziness: No  fatigue: Yes  fever: No  headaches: Yes  light-headedness: Yes  nausea: Yes  neck pain: Yes  palpitations: No  shortness of breath: No  vertigo: No  vomiting: No  Treatments tried: acetaminophen, bed rest, neck support, sleep  Improvement on treatment: moderate

## 2023-11-20 NOTE — LETTER
November 20, 2023     Patient: José Miguel Underwood   YOB: 1960   Date of Visit: 11/20/2023       To Whom It May Concern:    It is my medical opinion that José Miguel Underwood may return to work immediately with intermittent leave restrictions. He has post-concussion symptoms that can get exacerbated daily, and his symptoms can last up to 12 hours per episode. He should be excused from work on an intermittent basis for these flare ups. These restrictions are in place for one month, and he will be re-evaluated in 4 weeks for release to work without restrictions.     Please send any FMLA paperwork to our office, and if you have any questions please do not hesitate to reach out to our office.          Sincerely,        Timoteo Hudson DO    CC: No Recipients

## 2023-12-08 ENCOUNTER — TELEPHONE (OUTPATIENT)
Dept: FAMILY MEDICINE CLINIC | Facility: CLINIC | Age: 63
End: 2023-12-08

## 2023-12-08 NOTE — TELEPHONE ENCOUNTER
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 STILL HAVING BRAIN FOG. WOULD LIKE NEURO REFERRAL

## 2023-12-11 NOTE — TELEPHONE ENCOUNTER
"I called patient and made him aware of the following: \"Yes please have the patient follow-up as scheduled on the 18th.  He would like to be seen sooner that is okay as well.  Will discuss either refer him to neurology or to a concussion rehab \" FYI:  he said that the screen time isnt bothering him but driving at night is which he has now eliminated until he further sees you, he is also getting his vision back, still having some headaches though.  "

## 2024-01-04 ENCOUNTER — OFFICE VISIT (OUTPATIENT)
Dept: FAMILY MEDICINE CLINIC | Facility: CLINIC | Age: 64
End: 2024-01-04
Payer: COMMERCIAL

## 2024-01-04 VITALS
OXYGEN SATURATION: 98 % | HEIGHT: 71 IN | HEART RATE: 82 BPM | DIASTOLIC BLOOD PRESSURE: 82 MMHG | WEIGHT: 211 LBS | TEMPERATURE: 98 F | BODY MASS INDEX: 29.54 KG/M2 | SYSTOLIC BLOOD PRESSURE: 130 MMHG

## 2024-01-04 DIAGNOSIS — E78.41 ELEVATED LIPOPROTEIN(A): Primary | ICD-10-CM

## 2024-01-04 DIAGNOSIS — F07.81 POST CONCUSSION SYNDROME: ICD-10-CM

## 2024-01-04 PROCEDURE — 99214 OFFICE O/P EST MOD 30 MIN: CPT | Performed by: FAMILY MEDICINE

## 2024-01-04 RX ORDER — PITAVASTATIN 4 MG/1
4 TABLET, FILM COATED ORAL NIGHTLY
Qty: 90 TABLET | Refills: 1 | Status: SHIPPED | OUTPATIENT
Start: 2024-01-04

## 2024-01-04 RX ORDER — PITAVASTATIN 1 MG/1
1 TABLET, FILM COATED ORAL NIGHTLY
Qty: 30 TABLET | Refills: 2 | Status: SHIPPED | OUTPATIENT
Start: 2024-01-04 | End: 2024-01-04

## 2024-01-04 NOTE — PROGRESS NOTES
Follow Up Office Visit      Patient Name: José Miguel Underwood  : 1960   MRN: [id number]     Chief Complaint:    Chief Complaint   Patient presents with    Concussion       History of Present Illness: José Miguel Underwood is a 63 y.o. male who is here today to follow up with concussion symptoms that have been persistent for 2 months.  His main symptom currently is a headache and some neck pain.  Patient reports a headache in the on the right side of his head and wraparound his ear.  Patient says this is different from his previous headaches that were like sinus headaches.  Of note, patient had hit himself with a crowbar over the head.  This crowbar weighs about 40 pounds.  Does have fairly hard hit and the patient does have a small dent patient has had from a.  Patient has had improved headache and he reports that his symptoms have improved 70% overall.  He is currently working and has not taken time off.  Ports that the symptoms are worse after driving 3 hours both ways at other day to his job.    Headaches are worse in the afternoon.    Taking Excedrin for headache.    Has high cholesterol and has failed Crestor and Lipitor treatment in the past due to side effects of tiredness      Physical exam: Patient's mood and affect is appropriate.  Neurological exam grossly intact.  PERRLA.  Neck supple.  Somatic function noted in cervical region.      Subjective        I have reviewed and the following portions of the patient's history were updated as appropriate: past family history, past medical history, past social history, past surgical history and problem list.    Medications:     Current Outpatient Medications:     acyclovir (ZOVIRAX) 400 MG tablet, TAKE 2 TABLETS BY MOUTH EVERY 4 HOURS WHILE AWAKE. NO MORE THAN 5 DOSES A DAY, Disp: , Rfl:     aspirin 81 MG EC tablet, Asprin Ec Low Dose, Disp: , Rfl:     levothyroxine (Synthroid) 125 MCG tablet, Take 1 tablet by mouth Daily., Disp: 90 tablet, Rfl: 1    Mapap  "Arthritis Pain 650 MG 8 hr tablet, Take 1 tablet by mouth 3 (Three) Times a Day., Disp: , Rfl:     naproxen (NAPROSYN) 500 MG tablet, Take 1 tablet by mouth 2 (Two) Times a Day With Meals., Disp: 180 tablet, Rfl: 3    Pitavastatin Calcium 4 MG tablet, Take 1 tablet by mouth Every Night., Disp: 90 tablet, Rfl: 1    Allergies:   Allergies   Allergen Reactions    Shrimp Flavor Swelling    Diclofenac Hives    Loop Diuretics Nausea Only, Diarrhea and GI Intolerance    Shellfish Allergy Hives    Sulfa Antibiotics Hives       Objective     Physical Exam: Please see above  Vital Signs:   Vitals:    01/04/24 1550   BP: 130/82   Pulse: 82   Temp: 98 °F (36.7 °C)   SpO2: 98%   Weight: 95.7 kg (211 lb)   Height: 180.3 cm (71\")     Body mass index is 29.43 kg/m².          Assessment / Plan      Assessment/Plan:   Diagnoses and all orders for this visit:    1. Elevated lipoprotein(a) (Primary)  -     Discontinue: pitavastatin calcium (LIVALO) 1 MG tablet tablet; Take 1 tablet by mouth Every Night.  Dispense: 30 tablet; Refill: 2  -     Pitavastatin Calcium 4 MG tablet; Take 1 tablet by mouth Every Night.  Dispense: 90 tablet; Refill: 1    2. Post concussion syndrome    Postconcussion syndrome still active.  Patient still having headaches.  Should resolve over the next several months.  May benefit from concussion clinic for neurological rehabilitation which we will discuss next visit.      He also has high cholesterol with a history of failing Lipitor and Crestor due to tiredness side effects.  He is willing to try different medication because ASCVD risk is 14.6% as discussed today.    The 10-year ASCVD risk score ([place]and[place] SR, et al., 2019) is: 13.5%    Values used to calculate the score:      Age: 63 years      Sex: Male      Is Non- : No      Diabetic: No      Tobacco smoker: No      Systolic Blood Pressure: 130 mmHg      Is BP treated: No      HDL Cholesterol: 39 mg/dL      Total Cholesterol: 209 mg/dL "     Follow Up:   Return in about 3 months (around 4/4/2024) for Recheck.    Timoteo Hudson DO  [facility] [place]

## 2024-01-04 NOTE — LETTER
January 4, 2024     Patient: José Miguel Underwood   YOB: 1960   Date of Visit: 1/4/2024       To Whom It May Concern:    It is my medical opinion that José Miguel Underwood may return to light duty immediately with the following restrictions: Intermittently for severe headache.  Patient may need to take off from work up to 2 times per week for exacerbations of a concussion related headache .  These exacerbations can last up to 24 hours each.  This restriction is in place for the next 3 months.  We will follow-up with the patient in 3 months, and if you need any further information-please do not hesitate to reach out to my office.           Sincerely,        Timoteo Hudson DO    CC: No Recipients